# Patient Record
Sex: FEMALE | Race: WHITE
[De-identification: names, ages, dates, MRNs, and addresses within clinical notes are randomized per-mention and may not be internally consistent; named-entity substitution may affect disease eponyms.]

---

## 2019-02-11 ENCOUNTER — HOSPITAL ENCOUNTER (OUTPATIENT)
Dept: HOSPITAL 77 - KA.SDS | Age: 69
Discharge: HOME | End: 2019-02-11
Attending: FAMILY MEDICINE
Payer: MEDICARE

## 2019-02-11 DIAGNOSIS — Z79.899: ICD-10-CM

## 2019-02-11 DIAGNOSIS — K21.9: ICD-10-CM

## 2019-02-11 DIAGNOSIS — K31.7: ICD-10-CM

## 2019-02-11 DIAGNOSIS — K50.90: Primary | ICD-10-CM

## 2019-02-11 DIAGNOSIS — E78.5: ICD-10-CM

## 2019-02-11 LAB
ANION GAP SERPL CALC-SCNC: 11 MMOL/L (ref 5–15)
CHLORIDE SERPL-SCNC: 101 MMOL/L (ref 98–115)
SODIUM SERPL-SCNC: 139 MMOL/L (ref 136–145)

## 2019-02-11 PROCEDURE — 88305 TISSUE EXAM BY PATHOLOGIST: CPT

## 2019-02-11 PROCEDURE — 80048 BASIC METABOLIC PNL TOTAL CA: CPT

## 2019-02-11 PROCEDURE — 00813 ANES UPR LWR GI NDSC PX: CPT

## 2019-02-11 PROCEDURE — 36415 COLL VENOUS BLD VENIPUNCTURE: CPT

## 2019-02-11 PROCEDURE — 74177 CT ABD & PELVIS W/CONTRAST: CPT

## 2019-02-11 PROCEDURE — 43239 EGD BIOPSY SINGLE/MULTIPLE: CPT

## 2019-02-11 PROCEDURE — 45378 DIAGNOSTIC COLONOSCOPY: CPT

## 2019-02-11 NOTE — PCM.OPNOTE
- General Post-Op/Procedure Note


Date of Surgery/Procedure: 02/11/19


Operative Procedure(s): Upper GI endoscopy and polypectomy and colonoscopy.


Findings: 


A small polyp was noticed at the antrum of the stomach. Otherwise negative 

upper GI endoscopy. Negative colonoscopy.





Pre Op Diagnosis: Persistent upper abdominal pain, nausea vomiting. Rule out 

peptic ulcer disease gastric outlet obstruction, history of Crohn's disease.


Anesthesia Technique: MAC


Primary Surgeon: Christy Botello


Complications: None


Condition: Good


Free Text/Narrative:: 


INFORMED CONSENT: Patient is here today for elective upper GI endoscopy.  All 

aspects of this procedure have been discussed with the patient.  All possible 

complications also, including possibility of perforation, infection, pain, 

bleeding, numbness of the throat, swallowing difficulty and unknown 

complications.  In the event of perforation the patient may need surgical 

exploration to repair the defect.  The patient understands fully well.  Patient 

did not have any further questions for me at the end of my interview.  The 

patient wishes for me to proceed.





INSTRUMENT USED:  Video gastroscope





ANESTHESIA:  [MAC]





ASA CLASSIFICATION 2





PROCEDURE PERFORMED:  [Upper gastrointestinal endoscopy and polypectomy]





PHARYNX: Normal. 


ESOPHAGUS: Normal.


                 Proximal: Normal. 


                 Middle: Normal. 


                 Lower: Normal. 


                 GE Junction: Normal. 23 cm from the incisors.


STOMACH: Normal. 


              Cardia: Normal. 


              Fundus: Normal. 


              Lesser Curvature: Normal. 


              Greater Curvature: Normal. 


              Antrum: A small polyp was identified and removed using the cold 

biopsy forceps. 


              Pylorus: Normal. 


DUODENUM: Normal. 


                  First Part: Normal. 


                  Second Part: Normal. 


                  Third Part: Normal. 


RETROFLEXION: Normal. 


BIOPSY: As above.. 


TOLERANCE: Excellent. 


COMPLICATIONS: None. 





Small polyp of the antrum otherwise negative.








INFORMED CONSENT:  Patient is here today for elective colonoscopy.  All aspects 

of this procedure have been discussed with the patient.  All possible 

complications also, including possibility of perforation, infection, pain, 

bleeding and unknown complications.  In the event of perforation patient may 

need to have abdominal exploration, colon resection, colostomy and even death 

was discussed.  Anesthetic complications were handled by anesthesia department.

  The patient understands fully well.  Patient did not have any further 

questions for me at the end of my interview.  The patient wishes for me to 

proceed.





PREOPERATIVE DIAGNOSIS/INDICATIONS:  [Abdominal pain, cramping in nature. 

Frequent episodes of vomiting. Irregular bowel habits. History of Crohn's 

disease.]





POSTOPERATIVE DIAGNOSIS:  [Normal colonoscopy.]





INSTRUMENT USED: Olympus videocolonoscope.    





ASA CLASSIFICATION: [2]      





ANESTHESIA:  Continuous EKG, oximetry and intermittent blood pressure and 

respiratory monitoring were performed throughout the procedure.  IV Versed and 

Fentanyl were administered. 





PROCEDURE PERFORMED:   Colonoscopy


POSITIONS OF PATIENT: Left lateral.


RECTUM:  Normal.


SIGMOID COLON:  Normal. 


DESCENDING COLON:  Normal. 


SPLENIC FLEXURE:  Normal. 


TRANSVERSE COLON: Normal. 


HEPATIC FLEXURE:  Normal. 


ASCENDING COLON: Normal. 


CECUM:  Normal. 


ILEOCECAL VALVE:  Normal. The small bowel was entered into at the ileocecal 

junction and there was no evidence of inflammation or obstruction.


BIOPSY:  None. 


TOLERANCE:  Excellent. 


COMPLICATIONS:  None.

## 2020-10-05 NOTE — PCM.OPNOTE
- General Post-Op/Procedure Note


Date of Surgery/Procedure: 10/05/20


Operative Procedure(s): Upper gastrointestinal endoscopy and biopsies.


Findings: 


Antral gastritis and to antral gastric ulcers. Mild esophagitis also noted. 3 cm

hiatal hernia noted.





Pre Op Diagnosis: Persistent epigastric and upper abdominal pain and dyspepsia.


Post-Op Diagnosis: 2 antral gastric ulcers, gastritis and mild esophagitis.


Anesthesia Technique: MAC


Primary Surgeon: hCristy Botello


Condition: Good


Free Text/Narrative:: 


INFORMED CONSENT: Patient is here today for elective upper GI endoscopy.  All 

aspects of this procedure have been discussed with the patient.  All possible 

complications also, including possibility of perforation, infection, pain, 

bleeding, numbness of the throat, swallowing difficulty and unknown 

complications.  In the event of perforation the patient may need surgical 

exploration to repair the defect.  The patient understands fully well.  Patient 

did not have any further questions for me at the end of my interview.  The 

patient wishes for me to proceed.





INSTRUMENT USED:  Video gastroscope





ANESTHESIA:  [MAC]





ASA CLASSIFICATION: [2]





PROCEDURE PERFORMED:  Upper gastrointestinal endoscopy and biopsies





Tolerance: Excellent.


PHARYNX: Normal. 


ESOPHAGUS: Normal.


                 Proximal: Normal. 


                 Middle: Normal. 


                 Lower: 3 cm hiatal hernia noted. 


                 GE junction : Mild inflammatory changes noted of the lower end 

of the esophagus . 


STOMACH: Normal. 


              Cardia: Normal. 


              Fundus: Normal. 


              Lesser Curvature: Normal. 


              Greater Curvature: Normal. 


              Antrum: Moderate inflammation noted. 2 shallow 1 cm ulcers noted. 

Biopsies were taken from the edge of the ulcer.. 


              Pylorus: Normal. 


DUODENUM: Normal. 


                  First Part: Normal. 


                  Second Part: Normal. 


                  Third Part: Normal. 


RETROFLEXION: Normal. 


BIOPSY: From the edge of the antral gastric ulcer. Biopsies taken for 

documentation.


TOLERANCE: Excellent. 


COMPLICATIONS: None. 


Final diagnosis: #1: 3 cm hiatal hernia with mild inflammation


#2 shallow ulcers of the antrum and moderate antral gastritis noted.

## 2020-12-07 NOTE — PCM.OPNOTE
- General Post-Op/Procedure Note


Date of Surgery/Procedure: 12/07/20


Operative Procedure(s): Upper GI endoscopy, colonoscopy


Findings: 


Previously discovered antral ulcers are no longer present.  Ulcers healed.  

Previous esophagitis healed.  Multiple diverticuli of the sigmoid colon which is

also moderately tortuous.





Pre Op Diagnosis: Recently diagnosed prepyloric antral gastric ulcers.  Chronic 

diarrhea with constipation.  Previous history of Crohn's disease.


Post-Op Diagnosis: Healed gastric ulcers.  No evidence of esophagitis.  Multiple

diverticuli of the sigmoid colon.  Tortuous sigmoid colon.  Otherwise negative 

colonoscopy.


Anesthesia Technique: Local, MAC


Primary Surgeon: Christy Botello


Anesthesia Provider: Brittney Haskins


Condition: Good


Free Text/Narrative:: 


INFORMED CONSENT:  Patient is here today for elective colonoscopy.  All aspects 

of this procedure have been discussed with the patient.  All possible 

complications also, including possibility of perforation, infection, pain, 

bleeding and unknown complications.  In the event of perforation patient may 

need to have abdominal exploration, colon resection, colostomy and even death 

was discussed.  Anesthetic complications were handled by anesthesia department. 

The patient understands fully well.  Patient did not have any further questions 

for me at the end of my interview.  The patient wishes for me to proceed.





PREOPERATIVE DIAGNOSIS/INDICATIONS:  [Tonic diarrhea, previous history of 

Crohn's disease.]





POSTOPERATIVE DIAGNOSIS:  [Multiple diverticulosis of the sigmoid colon, 

otherwise negative.  No evidence of inflammatory bowel disease.]





INSTRUMENT USED: Olympus videocolonoscope.    





ASA CLASSIFICATION: [2]      





ANESTHESIA:  Continuous EKG, oximetry and intermittent blood pressure and 

respiratory monitoring were performed throughout the procedure.  IV Versed and 

Fentanyl were administered. 





PROCEDURE PERFORMED:   Colonoscopy


POSITIONS OF PATIENT: Left lateral.


RECTUM:  Normal.


SIGMOID COLON: Multiple diverticuli and long tortuous sigmoid colon noted.. 


DESCENDING COLON:  Normal. 


SPLENIC FLEXURE:  Normal. 


TRANSVERSE COLON: Normal. 


HEPATIC FLEXURE:  Normal. 


ASCENDING COLON: Normal. 


CECUM:  Normal. 


ILEOCECAL VALVE:  Normal. 


BIOPSY:  None. 


TOLERANCE:  Excellent. 


COMPLICATIONS:  None.


 


INFORMED CONSENT: Patient is here today for elective upper GI endoscopy.  All 

aspects of this procedure have been discussed with the patient.  All possible 

complications also, including possibility of perforation, infection, pain, ble

eding, numbness of the throat, swallowing difficulty and unknown complications. 

 In the event of perforation the patient may need surgical exploration to repair

 the defect.  The patient understands fully well.  Patient did not have any 

further questions for me at the end of my interview.  The patient wishes for me 

to proceed.





INSTRUMENT USED:  Video gastroscope





ANESTHESIA:  [MAC]





ASA CLASSIFICATION: [2]





PROCEDURE PERFORMED:  []





PHARYNX: Normal. 


ESOPHAGUS: Normal.


                 Proximal: Normal. 


                 Middle: Normal. 


                 Lower: Normal.  Previously noted esophagitis improved 

remarkably.


                 GE Junction: Normal. 


STOMACH: Normal. 


              Cardia: Normal. 


              Fundus: Normal. 


              Lesser Curvature: Normal. 


              Greater Curvature: Normal. 


              Antrum: Normal, previously noted gastric ulcers and antral ulcers 

are no longer present.


              Pylorus: Normal. 


DUODENUM: Normal. 


                  First Part: Normal. 


                  Second Part: Normal. 


                  Third Part: Normal. 


RETROFLEXION: Normal. 


BIOPSY: None. 


TOLERANCE: Excellent. 


COMPLICATIONS: None. 


Final diagnosis: Antral gastric ulcers.  Healed esophagitis.

## 2021-11-10 NOTE — PCM.OPNOTE
- General Post-Op/Procedure Note


Date of Surgery/Procedure: 11/10/21


Operative Procedure(s): Upper and lower GI endoscopy.


Findings: 


5 cm hiatal hernia, otherwise negative upper GI endoscopy.


Negative colonoscopy.





Pre Op Diagnosis: History of melena, history of peptic ulcer disease, history of

Crohn's disease


Post-Op Diagnosis: Normal upper and lower GI endoscopy.  No cause for the 

bleeding was found on today's examination.


Anesthesia Technique: [facility]


Primary Surgeon: Christy Botello


Complications: None


Condition: Good


Free Text/Narrative:: 


INFORMED CONSENT: Patient is here today for elective upper GI endoscopy.  All 

aspects of this procedure have been discussed with the patient.  All possible 

complications also, including possibility of perforation, infection, pain, 

bleeding, numbness of the throat, swallowing difficulty and unknown 

complications.  In the event of perforation the patient may need surgical ex

ploration to repair the defect.  The patient understands fully well.  Patient 

did not have any further questions for me at the end of my interview.  The 

patient wishes for me to proceed.





INSTRUMENT USED:  Video gastroscope





ANESTHESIA:  [MAC]





ASA CLASSIFICATION: [2]





PROCEDURE PERFORMED:  [Upper GI endoscopy]





PHARYNX: Normal. 


ESOPHAGUS: Normal.


                 Proximal: Normal. 


                 Middle: Normal. 


                 Lower: Normal. 


                 GE Junction: Normal except for 5 cm hiatal hernia without 

inflammation.


STOMACH: Normal. 


              Cardia: Normal. 


              Fundus: Normal. 


              Lesser Curvature: Normal. 


              Greater Curvature: Normal. 


              Antrum: Normal. 


              Pylorus: Normal. 


DUODENUM: Normal. 


                  First Part: Normal. 


                  Second Part: Normal. 


                  Third Part: Normal. 


RETROFLEXION: Normal. 


BIOPSY: None. 


TOLERANCE: Excellent. 


COMPLICATIONS: None. 





INFORMED CONSENT:  Patient is here today for elective colonoscopy.  All aspects 

of this procedure have been discussed with the patient.  All possible 

complications also, including possibility of perforation, infection, pain, 

bleeding and unknown complications.  In the event of perforation patient may 

need to have abdominal exploration, colon resection, colostomy and even death 

was discussed.  Anesthetic complications were handled by anesthesia department. 

The patient understands fully well.  Patient did not have any further questions 

for me at the end of my interview.  The patient wishes for me to proceed.





PREOPERATIVE DIAGNOSIS/INDICATIONS:  [History of melena.]





POSTOPERATIVE DIAGNOSIS:  [Normal colonoscopy without any evidence for bleeding 

lesion.]





INSTRUMENT USED: i[employer] videocolonoscope.    





ASA CLASSIFICATION: [2]      





ANESTHESIA:  Continuous EKG, oximetry and intermittent blood pressure and 

respiratory monitoring were performed throughout the procedure.  IV Versed and 

Fentanyl were administered. 





PROCEDURE PERFORMED:   Colonoscopy


POSITIONS OF PATIENT: Left lateral.


RECTUM:  Normal.


SIGMOID COLON:  Normal. 


DESCENDING COLON:  Normal. 


SPLENIC FLEXURE:  Normal. 


TRANSVERSE COLON: Normal. 


HEPATIC FLEXURE:  Normal. 


ASCENDING COLON: Normal. 


CECUM:  Normal. 


ILEOCECAL VALVE:  Normal. 


BIOPSY:  None. 


TOLERANCE:  Excellent. 


COMPLICATIONS:  None.


 





Final diagnosis: No evidence of bleeding noted during this examination of the 

upper and lower gastrointestinal tracts.